# Patient Record
Sex: MALE | Race: ASIAN | NOT HISPANIC OR LATINO
[De-identification: names, ages, dates, MRNs, and addresses within clinical notes are randomized per-mention and may not be internally consistent; named-entity substitution may affect disease eponyms.]

---

## 2017-11-28 PROBLEM — Z00.00 ENCOUNTER FOR PREVENTIVE HEALTH EXAMINATION: Status: ACTIVE | Noted: 2017-11-28

## 2017-12-11 ENCOUNTER — APPOINTMENT (OUTPATIENT)
Dept: OPHTHALMOLOGY | Facility: CLINIC | Age: 52
End: 2017-12-11
Payer: MEDICAID

## 2017-12-11 PROCEDURE — 92225: CPT | Mod: RT

## 2017-12-11 PROCEDURE — 92004 COMPRE OPH EXAM NEW PT 1/>: CPT

## 2018-01-29 ENCOUNTER — APPOINTMENT (OUTPATIENT)
Dept: OPHTHALMOLOGY | Facility: CLINIC | Age: 53
End: 2018-01-29

## 2020-12-14 ENCOUNTER — APPOINTMENT (OUTPATIENT)
Dept: OPHTHALMOLOGY | Facility: CLINIC | Age: 55
End: 2020-12-14
Payer: MEDICAID

## 2020-12-14 ENCOUNTER — NON-APPOINTMENT (OUTPATIENT)
Age: 55
End: 2020-12-14

## 2020-12-14 PROCEDURE — 92250 FUNDUS PHOTOGRAPHY W/I&R: CPT

## 2020-12-14 PROCEDURE — 76514 ECHO EXAM OF EYE THICKNESS: CPT

## 2020-12-14 PROCEDURE — 99072 ADDL SUPL MATRL&STAF TM PHE: CPT

## 2020-12-14 PROCEDURE — 92015 DETERMINE REFRACTIVE STATE: CPT

## 2020-12-14 PROCEDURE — 92004 COMPRE OPH EXAM NEW PT 1/>: CPT

## 2021-01-11 ENCOUNTER — NON-APPOINTMENT (OUTPATIENT)
Age: 56
End: 2021-01-11

## 2021-01-11 ENCOUNTER — APPOINTMENT (OUTPATIENT)
Dept: OPHTHALMOLOGY | Facility: CLINIC | Age: 56
End: 2021-01-11
Payer: MEDICAID

## 2021-01-11 PROCEDURE — 92012 INTRM OPH EXAM EST PATIENT: CPT

## 2021-01-11 PROCEDURE — 92083 EXTENDED VISUAL FIELD XM: CPT

## 2021-01-11 PROCEDURE — 99072 ADDL SUPL MATRL&STAF TM PHE: CPT

## 2021-01-11 PROCEDURE — 92133 CPTRZD OPH DX IMG PST SGM ON: CPT

## 2021-11-12 ENCOUNTER — EMERGENCY (EMERGENCY)
Facility: HOSPITAL | Age: 56
LOS: 1 days | Discharge: HOME | End: 2021-11-12
Attending: STUDENT IN AN ORGANIZED HEALTH CARE EDUCATION/TRAINING PROGRAM | Admitting: EMERGENCY MEDICINE
Payer: MEDICAID

## 2021-11-12 VITALS
HEART RATE: 95 BPM | TEMPERATURE: 98 F | SYSTOLIC BLOOD PRESSURE: 183 MMHG | RESPIRATION RATE: 18 BRPM | DIASTOLIC BLOOD PRESSURE: 124 MMHG | OXYGEN SATURATION: 97 %

## 2021-11-12 PROCEDURE — 93010 ELECTROCARDIOGRAM REPORT: CPT

## 2021-11-12 PROCEDURE — 99285 EMERGENCY DEPT VISIT HI MDM: CPT | Mod: 25

## 2021-11-12 NOTE — ED ADULT TRIAGE NOTE - CHIEF COMPLAINT QUOTE
c/o dental pain x 2 days. states had root canal and cap placed a few weeks ago, thinks might be infected. hx htn, dm type 2

## 2021-11-13 LAB
ALBUMIN SERPL ELPH-MCNC: 4.4 G/DL — SIGNIFICANT CHANGE UP (ref 3.3–5)
ALP SERPL-CCNC: 59 U/L — SIGNIFICANT CHANGE UP (ref 40–120)
ALT FLD-CCNC: 28 U/L — SIGNIFICANT CHANGE UP (ref 4–41)
ANION GAP SERPL CALC-SCNC: 13 MMOL/L — SIGNIFICANT CHANGE UP (ref 7–14)
APTT BLD: 32.4 SEC — SIGNIFICANT CHANGE UP (ref 27–36.3)
AST SERPL-CCNC: 21 U/L — SIGNIFICANT CHANGE UP (ref 4–40)
B PERT DNA SPEC QL NAA+PROBE: SIGNIFICANT CHANGE UP
B PERT+PARAPERT DNA PNL SPEC NAA+PROBE: SIGNIFICANT CHANGE UP
BASOPHILS # BLD AUTO: 0.01 K/UL — SIGNIFICANT CHANGE UP (ref 0–0.2)
BASOPHILS # BLD AUTO: 0.02 K/UL — SIGNIFICANT CHANGE UP (ref 0–0.2)
BASOPHILS NFR BLD AUTO: 0.1 % — SIGNIFICANT CHANGE UP (ref 0–2)
BASOPHILS NFR BLD AUTO: 0.2 % — SIGNIFICANT CHANGE UP (ref 0–2)
BILIRUB SERPL-MCNC: 0.4 MG/DL — SIGNIFICANT CHANGE UP (ref 0.2–1.2)
BLD GP AB SCN SERPL QL: NEGATIVE — SIGNIFICANT CHANGE UP
BORDETELLA PARAPERTUSSIS (RAPRVP): SIGNIFICANT CHANGE UP
BUN SERPL-MCNC: 17 MG/DL — SIGNIFICANT CHANGE UP (ref 7–23)
C PNEUM DNA SPEC QL NAA+PROBE: SIGNIFICANT CHANGE UP
CALCIUM SERPL-MCNC: 9.3 MG/DL — SIGNIFICANT CHANGE UP (ref 8.4–10.5)
CHLORIDE SERPL-SCNC: 102 MMOL/L — SIGNIFICANT CHANGE UP (ref 98–107)
CK MB BLD-MCNC: 2.8 % — HIGH (ref 0–2.5)
CK MB CFR SERPL CALC: 5.2 NG/ML — SIGNIFICANT CHANGE UP
CK SERPL-CCNC: 189 U/L — SIGNIFICANT CHANGE UP (ref 30–200)
CO2 SERPL-SCNC: 22 MMOL/L — SIGNIFICANT CHANGE UP (ref 22–31)
CREAT SERPL-MCNC: 1.01 MG/DL — SIGNIFICANT CHANGE UP (ref 0.5–1.3)
EOSINOPHIL # BLD AUTO: 0.01 K/UL — SIGNIFICANT CHANGE UP (ref 0–0.5)
EOSINOPHIL # BLD AUTO: 0.11 K/UL — SIGNIFICANT CHANGE UP (ref 0–0.5)
EOSINOPHIL NFR BLD AUTO: 0.1 % — SIGNIFICANT CHANGE UP (ref 0–6)
EOSINOPHIL NFR BLD AUTO: 0.9 % — SIGNIFICANT CHANGE UP (ref 0–6)
FLUAV SUBTYP SPEC NAA+PROBE: SIGNIFICANT CHANGE UP
FLUBV RNA SPEC QL NAA+PROBE: SIGNIFICANT CHANGE UP
GLUCOSE SERPL-MCNC: 159 MG/DL — HIGH (ref 70–99)
HADV DNA SPEC QL NAA+PROBE: SIGNIFICANT CHANGE UP
HCOV 229E RNA SPEC QL NAA+PROBE: SIGNIFICANT CHANGE UP
HCOV HKU1 RNA SPEC QL NAA+PROBE: SIGNIFICANT CHANGE UP
HCOV NL63 RNA SPEC QL NAA+PROBE: SIGNIFICANT CHANGE UP
HCOV OC43 RNA SPEC QL NAA+PROBE: SIGNIFICANT CHANGE UP
HCT VFR BLD CALC: 41.8 % — SIGNIFICANT CHANGE UP (ref 39–50)
HCT VFR BLD CALC: 43.7 % — SIGNIFICANT CHANGE UP (ref 39–50)
HGB BLD-MCNC: 14.3 G/DL — SIGNIFICANT CHANGE UP (ref 13–17)
HGB BLD-MCNC: 14.8 G/DL — SIGNIFICANT CHANGE UP (ref 13–17)
HMPV RNA SPEC QL NAA+PROBE: SIGNIFICANT CHANGE UP
HPIV1 RNA SPEC QL NAA+PROBE: SIGNIFICANT CHANGE UP
HPIV2 RNA SPEC QL NAA+PROBE: SIGNIFICANT CHANGE UP
HPIV3 RNA SPEC QL NAA+PROBE: SIGNIFICANT CHANGE UP
HPIV4 RNA SPEC QL NAA+PROBE: SIGNIFICANT CHANGE UP
IANC: 10.3 K/UL — HIGH (ref 1.5–8.5)
IANC: 8.64 K/UL — HIGH (ref 1.5–8.5)
IMM GRANULOCYTES NFR BLD AUTO: 0.2 % — SIGNIFICANT CHANGE UP (ref 0–1.5)
IMM GRANULOCYTES NFR BLD AUTO: 0.3 % — SIGNIFICANT CHANGE UP (ref 0–1.5)
INR BLD: 1.04 RATIO — SIGNIFICANT CHANGE UP (ref 0.88–1.16)
LYMPHOCYTES # BLD AUTO: 1.35 K/UL — SIGNIFICANT CHANGE UP (ref 1–3.3)
LYMPHOCYTES # BLD AUTO: 11.4 % — LOW (ref 13–44)
LYMPHOCYTES # BLD AUTO: 24.9 % — SIGNIFICANT CHANGE UP (ref 13–44)
LYMPHOCYTES # BLD AUTO: 3.09 K/UL — SIGNIFICANT CHANGE UP (ref 1–3.3)
M PNEUMO DNA SPEC QL NAA+PROBE: SIGNIFICANT CHANGE UP
MCHC RBC-ENTMCNC: 28.4 PG — SIGNIFICANT CHANGE UP (ref 27–34)
MCHC RBC-ENTMCNC: 28.6 PG — SIGNIFICANT CHANGE UP (ref 27–34)
MCHC RBC-ENTMCNC: 33.9 GM/DL — SIGNIFICANT CHANGE UP (ref 32–36)
MCHC RBC-ENTMCNC: 34.2 GM/DL — SIGNIFICANT CHANGE UP (ref 32–36)
MCV RBC AUTO: 83.6 FL — SIGNIFICANT CHANGE UP (ref 80–100)
MCV RBC AUTO: 83.7 FL — SIGNIFICANT CHANGE UP (ref 80–100)
MONOCYTES # BLD AUTO: 0.09 K/UL — SIGNIFICANT CHANGE UP (ref 0–0.9)
MONOCYTES # BLD AUTO: 0.53 K/UL — SIGNIFICANT CHANGE UP (ref 0–0.9)
MONOCYTES NFR BLD AUTO: 0.8 % — LOW (ref 2–14)
MONOCYTES NFR BLD AUTO: 4.3 % — SIGNIFICANT CHANGE UP (ref 2–14)
NEUTROPHILS # BLD AUTO: 10.3 K/UL — HIGH (ref 1.8–7.4)
NEUTROPHILS # BLD AUTO: 8.64 K/UL — HIGH (ref 1.8–7.4)
NEUTROPHILS NFR BLD AUTO: 69.5 % — SIGNIFICANT CHANGE UP (ref 43–77)
NEUTROPHILS NFR BLD AUTO: 87.3 % — HIGH (ref 43–77)
NRBC # BLD: 0 /100 WBCS — SIGNIFICANT CHANGE UP
NRBC # BLD: 0 /100 WBCS — SIGNIFICANT CHANGE UP
NRBC # FLD: 0 K/UL — SIGNIFICANT CHANGE UP
NRBC # FLD: 0 K/UL — SIGNIFICANT CHANGE UP
PLATELET # BLD AUTO: 232 K/UL — SIGNIFICANT CHANGE UP (ref 150–400)
PLATELET # BLD AUTO: 234 K/UL — SIGNIFICANT CHANGE UP (ref 150–400)
POTASSIUM SERPL-MCNC: 3.8 MMOL/L — SIGNIFICANT CHANGE UP (ref 3.5–5.3)
POTASSIUM SERPL-SCNC: 3.8 MMOL/L — SIGNIFICANT CHANGE UP (ref 3.5–5.3)
PROT SERPL-MCNC: 8.4 G/DL — HIGH (ref 6–8.3)
PROTHROM AB SERPL-ACNC: 11.9 SEC — SIGNIFICANT CHANGE UP (ref 10.6–13.6)
RAPID RVP RESULT: SIGNIFICANT CHANGE UP
RBC # BLD: 5 M/UL — SIGNIFICANT CHANGE UP (ref 4.2–5.8)
RBC # BLD: 5.22 M/UL — SIGNIFICANT CHANGE UP (ref 4.2–5.8)
RBC # FLD: 12.6 % — SIGNIFICANT CHANGE UP (ref 10.3–14.5)
RBC # FLD: 12.7 % — SIGNIFICANT CHANGE UP (ref 10.3–14.5)
RH IG SCN BLD-IMP: POSITIVE — SIGNIFICANT CHANGE UP
RSV RNA SPEC QL NAA+PROBE: SIGNIFICANT CHANGE UP
RV+EV RNA SPEC QL NAA+PROBE: SIGNIFICANT CHANGE UP
SARS-COV-2 RNA SPEC QL NAA+PROBE: SIGNIFICANT CHANGE UP
SODIUM SERPL-SCNC: 137 MMOL/L — SIGNIFICANT CHANGE UP (ref 135–145)
TROPONIN T, HIGH SENSITIVITY RESULT: 21 NG/L — SIGNIFICANT CHANGE UP
TROPONIN T, HIGH SENSITIVITY RESULT: 24 NG/L — SIGNIFICANT CHANGE UP
WBC # BLD: 11.8 K/UL — HIGH (ref 3.8–10.5)
WBC # BLD: 12.42 K/UL — HIGH (ref 3.8–10.5)
WBC # FLD AUTO: 11.8 K/UL — HIGH (ref 3.8–10.5)
WBC # FLD AUTO: 12.42 K/UL — HIGH (ref 3.8–10.5)

## 2021-11-13 PROCEDURE — G1004: CPT

## 2021-11-13 PROCEDURE — 70487 CT MAXILLOFACIAL W/DYE: CPT | Mod: 26,MG

## 2021-11-13 PROCEDURE — 71045 X-RAY EXAM CHEST 1 VIEW: CPT | Mod: 26

## 2021-11-13 PROCEDURE — 70450 CT HEAD/BRAIN W/O DYE: CPT | Mod: 26,MA

## 2021-11-13 PROCEDURE — 99218: CPT | Mod: 25

## 2021-11-13 RX ORDER — INSULIN LISPRO 100/ML
VIAL (ML) SUBCUTANEOUS
Refills: 0 | Status: DISCONTINUED | OUTPATIENT
Start: 2021-11-13 | End: 2021-11-16

## 2021-11-13 RX ORDER — GLUCAGON INJECTION, SOLUTION 0.5 MG/.1ML
1 INJECTION, SOLUTION SUBCUTANEOUS ONCE
Refills: 0 | Status: DISCONTINUED | OUTPATIENT
Start: 2021-11-13 | End: 2021-11-16

## 2021-11-13 RX ORDER — DEXTROSE 50 % IN WATER 50 %
12.5 SYRINGE (ML) INTRAVENOUS ONCE
Refills: 0 | Status: DISCONTINUED | OUTPATIENT
Start: 2021-11-13 | End: 2021-11-16

## 2021-11-13 RX ORDER — ACETAMINOPHEN 500 MG
650 TABLET ORAL ONCE
Refills: 0 | Status: COMPLETED | OUTPATIENT
Start: 2021-11-13 | End: 2021-11-13

## 2021-11-13 RX ORDER — DEXTROSE 50 % IN WATER 50 %
25 SYRINGE (ML) INTRAVENOUS ONCE
Refills: 0 | Status: DISCONTINUED | OUTPATIENT
Start: 2021-11-13 | End: 2021-11-16

## 2021-11-13 RX ORDER — SODIUM CHLORIDE 9 MG/ML
1000 INJECTION, SOLUTION INTRAVENOUS
Refills: 0 | Status: DISCONTINUED | OUTPATIENT
Start: 2021-11-13 | End: 2021-11-16

## 2021-11-13 RX ORDER — AMPICILLIN SODIUM AND SULBACTAM SODIUM 250; 125 MG/ML; MG/ML
3 INJECTION, POWDER, FOR SUSPENSION INTRAMUSCULAR; INTRAVENOUS EVERY 6 HOURS
Refills: 0 | Status: DISCONTINUED | OUTPATIENT
Start: 2021-11-13 | End: 2021-11-16

## 2021-11-13 RX ORDER — DEXTROSE 50 % IN WATER 50 %
15 SYRINGE (ML) INTRAVENOUS ONCE
Refills: 0 | Status: DISCONTINUED | OUTPATIENT
Start: 2021-11-13 | End: 2021-11-16

## 2021-11-13 RX ORDER — ACETAMINOPHEN 500 MG
650 TABLET ORAL ONCE
Refills: 0 | Status: DISCONTINUED | OUTPATIENT
Start: 2021-11-13 | End: 2021-11-13

## 2021-11-13 RX ADMIN — Medication 1: at 12:00

## 2021-11-13 RX ADMIN — Medication 650 MILLIGRAM(S): at 18:13

## 2021-11-13 RX ADMIN — AMPICILLIN SODIUM AND SULBACTAM SODIUM 200 GRAM(S): 250; 125 INJECTION, POWDER, FOR SUSPENSION INTRAMUSCULAR; INTRAVENOUS at 18:20

## 2021-11-13 RX ADMIN — Medication 1 TABLET(S): at 04:15

## 2021-11-13 RX ADMIN — Medication 650 MILLIGRAM(S): at 18:41

## 2021-11-13 RX ADMIN — Medication 650 MILLIGRAM(S): at 01:06

## 2021-11-13 RX ADMIN — AMPICILLIN SODIUM AND SULBACTAM SODIUM 200 GRAM(S): 250; 125 INJECTION, POWDER, FOR SUSPENSION INTRAMUSCULAR; INTRAVENOUS at 06:46

## 2021-11-13 RX ADMIN — AMPICILLIN SODIUM AND SULBACTAM SODIUM 200 GRAM(S): 250; 125 INJECTION, POWDER, FOR SUSPENSION INTRAMUSCULAR; INTRAVENOUS at 13:06

## 2021-11-13 RX ADMIN — Medication 50 MILLIGRAM(S): at 06:07

## 2021-11-13 NOTE — ED ADULT NURSE REASSESSMENT NOTE - NS ED NURSE REASSESS COMMENT FT1
Patient arrives to room 17, as a code stroke due to left sided numbness. Patient was originally evaluated in intake for dental pain. During a dental consult patient began experiencing numbness to the left side of his face, which prompted the code stroke. Patient is alert oriented, speaking in full sentences. Left facial swelling noted at this time with no active bleeding. No facial droop or slurred speech noted. Pt has equal strength, motor, and sensation to bilateral upper and lower extremities. No drifts noted to bilateral upper and lower extremities. Respirations are equal and unlabored. Placed on a cardiac monitor, and will continue to monitor. Patient arrives to room 17, as a code stroke due to left sided numbness. Patient was originally evaluated in intake for dental pain. During a dental consult patient began experiencing numbness to the left side of his face, which prompted the code stroke. Patient is alert oriented, speaking in full sentences. Left facial, lower lip swelling noted at this time with no active bleeding. No facial droop or slurred speech noted. Pt has equal strength, motor, and sensation to bilateral upper and lower extremities. No drifts noted to bilateral upper and lower extremities. Respirations are equal and unlabored. Placed on a cardiac monitor, and will continue to monitor.

## 2021-11-13 NOTE — ED PROVIDER NOTE - PHYSICAL EXAMINATION
Vital signs reviewed.   CONSTITUTIONAL: Well-appearing; well-nourished; in no apparent distress. Non-toxic appearing.   HEAD: Normocephalic, atraumatic.  EYES: PERRL, EOM intact, conjunctiva and sclera WNL.  ENT: normal nose; no rhinorrhea; normal pharynx, +lip swelling, no drooling, no sublingual edema, +TTP noted Lt tooth # 14. No gingival abscess noted. +intraoral mucosa irritation noted. Airway patent.   CARD: Normal S1, S2; no murmurs, rubs, or gallops noted.  RESP: Normal chest excursion with respiration; breath sounds clear and equal bilaterally; no wheezes, rhonchi, or rales.  EXT/MS: moves all extremities;   SKIN: Normal for age and race; Mild Left facial swelling noted. No crepitus. No erythema.   NEURO: Awake, alert, oriented x 3, no gross deficits, no motor or sensory deficit noted.  PSYCH: Normal mood; appropriate affect.

## 2021-11-13 NOTE — ED ADULT NURSE REASSESSMENT NOTE - NS ED NURSE REASSESS COMMENT FT1
report givne to cdu/  pt a&4, lower lip swollen and upper slightly swollen/ offers no complaints of pain/  iv patent in rt ac 18 g/ med infusing by nite shift

## 2021-11-13 NOTE — ED ADULT NURSE NOTE - OBJECTIVE STATEMENT
pt received in intake. A&OX3 ambulatory. c/o dental pain x 2 days. states had root canal and cap placed a few weeks ago. denies fevers or SOB/trouble breathing. resp even and unlabored. Pt R side of face appears swollen. denies bleeding or discharge from area. will continue to monitor.

## 2021-11-13 NOTE — ED ADULT NURSE REASSESSMENT NOTE - NS ED NURSE REASSESS COMMENT FT1
BREAK RN: Pt A&O x 4. In NAD at this time. Needs anticipated and met in a timely manner. Stretcher in lowest position, wheels locked, appropriate side rails in place, call bell in reach.

## 2021-11-13 NOTE — ED PROVIDER NOTE - PROGRESS NOTE DETAILS
Ilia NAVARRO: Pt signed out to me.  He has been evaluated by dental, rec'd local anesthesia, I&D.  Pt with worsening of facial swelling since ED arrival and code stroke called by previous provider.  Pt with L sided facial droop involving upper and lower face.  No other noted deficits on exam.  Pt with significant swelling of face and lips, but airway is maintained.  Pending CT maxiface and head, dental aware and re-evaluating.  Peripheral facial nerve palsy ?mass vs abscess vs inflammation/swelling pending CT. PA Rodrigez- Stroke work up negative. Facial droop/palsy likely 2/2 from nerve/dental block.  Pt CT maxillofacial showing +dental abscess, drain in place. And ?sinusitis. Results reviewed with dental resident and they agree with further dose of abx and monitoring of facial swelling, no indication for further imaging or intervention. Pt agreeable with stay. Facial asymmetry improving. CDU PA accepted.

## 2021-11-13 NOTE — ED CDU PROVIDER INITIAL DAY NOTE - ATTENDING CONTRIBUTION TO CARE
ATTENDING, MD Julee: I have personally performed a face to face diagnostic evaluation on this patient.  I have reviewed the ACP note and agree with the history, exam, and plan of care, except as noted here. Progress notes and further evaluation to be reviewed by observation and discharging attending.    55 yo M with HTN, HLD, DM, s/p dental procedure about 1mo ago, now having swollen face, neurological deficits on the face, initiated code stroke yesterday, but now no facial or ext neurological dysfunction, likely facial nv palsy 2/2 inflammation, angio edematic swelling, a part of the soft palate is visible, and airway is patent, but given his acute course of the swelling needs airway evaluation by ENT. dispo pending but if airway patent by ENT scope, pt is safe for discharge to the original dental surgeon. Pt shows understanding.

## 2021-11-13 NOTE — ED CDU PROVIDER INITIAL DAY NOTE - PROGRESS NOTE DETAILS
JENAE Jorge: pt resting comfortably NAD, pt seen and evaluated by ENT to assess airway given lip swelling; per ENT airway patent no indication for scope at this time.  Pt denies sob, chest pain, throat tightness.  Pt speaking in full sentences, tolerating secretions.  Will continue to monitor. JENAE Jorge: pt resting comfortably NAD, swelling to lips/face improving, pt denies chest pain, sob, throat discomfort.  Will continue to monitor.

## 2021-11-13 NOTE — CONSULT NOTE ADULT - SUBJECTIVE AND OBJECTIVE BOX
HPI:  Patient is a 56 y.o male with PMHx of HTN, HLD, DM who presents to ED c/o Lt upper dental pain intermittently x 3 weeks with associated facial swelling. Pt states that approx 1 month ago he had root canal with cap placed to Lt upper tooth # 14. Pt noting recent Lt side facial swelling and now lip swelling. Pt denies fevers, chills, discharge, slurred speech, n/v/d, trauma, rashes, throat itching/swelling, sob, dysphagia or any other complaints.  Not on ace inhibitor, NKDA.       Physical Exam  T(C): 36.8 (11-13-21 @ 14:21), Max: 36.8 (11-12-21 @ 22:44)  HR: 98 (11-13-21 @ 14:21) (78 - 98)  BP: 147/74 (11-13-21 @ 14:21) (147/74 - 183/124)  RR: 15 (11-13-21 @ 14:21) (14 - 18)  SpO2: 97% (11-13-21 @ 14:21) (96% - 100%)  General: NAD, A+Ox3  Resp: No respiratory distress, stridor, or stertor  Voice quality: normal  Face:  Symmetric without masses or lesions  OU: EOMI  OC/OP: tongue normal, floor of mouth soft, no masses or lesions, OP clear with no intraoral edema  Neck: soft/flat    A/P: 56y Male p/w facial swelling in setting of dental infection s/p root canal of L upper tooth. Patient has no throat discomfort or difficulty breathing. No intraoral edema noted, FOM soft. Swelling likely 2/2 active tooth infection and unlikely angioedema.   -will defer FOE for now as low suspicion for angioedema based off of hx and PE  -recommend continued abx   -please page if pt develops SOB, stridor    --------------------------------------------------------------  Thank you for the consult,    Rocío Mccracken MD  Resident  Department of Otolaryngology - Head and Neck Surgery  Peds Page #[phone]  Adult Page #[phone]  ---------------------------------------------------------------

## 2021-11-13 NOTE — ED PROVIDER NOTE - ATTENDING CONTRIBUTION TO CARE
I have personally performed a face to face medical and diagnostic evaluation of the patient. I have discussed with and reviewed the ACP's note and agree with the History, ROS, Physical Exam and MDM unless otherwise indicated. A brief summary of my personal evaluation and impression can be found below.     55yo M hx htn hld dm p.w tooth #14 pain where had had a cap replaced 1 month ago from old root canal now w/ intermittent pain x several weeks now worsening today. Been using peroxide and orajel to relief. Notes since arriving in ED has L sided facial swelling and lip swelling since arriving to ED. No slurred speech focal weakness/numbness  VITALS: Initial triage and subsequent vitals have been reviewed by me.  Gen: Well appearing, NAD, alert, non-toxic  Head: NCAT  HEENT: PERRL, MMM, normal conjunctiva, anicteric, neck supple, facial swelling to lips and mildly to L face, inflammation near tooth #14 no obvious fluctuance  Lung: CTAB, no adventitious sounds  CV: RRR, no murmurs, 2+symmetric peripheral pulses  Abd: soft, NTND, no rebound or guarding, no palpable masses  MSK: No edema, no visible deformities  Neuro: Following commands appropriately, fluid speech, L facial droop nasolabial fold flattening, forehead spared, 5/5 strength and normal sensation in all extremities. Ambulatory with stable gait.  Skin: Warm and dry, no evidence of rash  Psych: normal mood and affect  Lip swelling concern of dental abscess w/ dental for eval. Upon my evaluation w/ apparent facial palsy concern for invasive infx vs palsy from local anesthesia vs lower suspicion CVA. Will get neuro eval and CTH/max face and reassess I have personally performed a face to face medical and diagnostic evaluation of the patient. I have discussed with and reviewed the ACP's note and agree with the History, ROS, Physical Exam and MDM unless otherwise indicated. A brief summary of my personal evaluation and impression can be found below.     55yo M hx htn hld dm p.w tooth #14 pain where had had a cap replaced 1 month ago from old root canal now w/ intermittent pain x several weeks now worsening today. Been using peroxide and orajel to relief. Notes since arriving in ED has L sided facial swelling and lip swelling since arriving to ED. No slurred speech focal weakness/numbness  VITALS: Initial triage and subsequent vitals have been reviewed by me.  Gen: Well appearing, NAD, alert, non-toxic  Head: NCAT  HEENT: PERRL, MMM, normal conjunctiva, anicteric, neck supple, facial swelling to lips and mildly to L face, inflammation near tooth #14 no obvious fluctuance  Lung: CTAB, no adventitious sounds  CV: RRR, no murmurs, 2+symmetric peripheral pulses  Abd: soft, NTND, no rebound or guarding, no palpable masses  MSK: No edema, no visible deformities  Neuro: Following commands appropriately, fluid speech, L facial droop nasolabial fold flattening, forehead spared, 5/5 strength and normal sensation in all extremities. Ambulatory with stable gait.  Skin: Warm and dry, no evidence of rash  Psych: normal mood and affect  Lip swelling concern of dental abscess w/ dental for eval. Upon my evaluation w/ apparent facial palsy concern for invasive infx vs palsy from local anesthesia vs lower suspicion CVA. Per accompanying ACP, facial assymetry/nasolabial fold was poss slightly present but is now sig worse. Will call code stroke, get neuro eval and CT/max face and reassess

## 2021-11-13 NOTE — ED PROVIDER NOTE - CLINICAL SUMMARY MEDICAL DECISION MAKING FREE TEXT BOX
HPI- Patient is a 56 y.o male with PMHx of HTN, HLD, DM who presents to ED c/o Lt upper dental pain intermittently x 3 weeks. Pt states that approx 1 month ago he had root canal with cap placed to Lt upper tooth # 14. Pt states he has been having intermittent Lt tooth sensitivities for past few weeks, today pain was more severe. Pt states he has been using orajel and used almost full bottle of peroxide doing mouth washes. Pt noting Lt side facial swelling and now lip swelling. Pt denies fevers, chills, discharge, slurred speech, n/v/d, trauma, rashes, throat itching/swelling, sob, dysphagia or any other complaints.       Ddx- dental pain, no gross signs of infection, lip swelling and intraoral mucosal irritation appears likely 2/2 overuse of peroxide. Dental called for consult. Will give pain meds.

## 2021-11-13 NOTE — ED CDU PROVIDER INITIAL DAY NOTE - MEDICAL DECISION MAKING DETAILS
Pt evaluated in ED, dental consulted, small abscess noted, drain placed, recommended iv abx and dc on augmentin

## 2021-11-13 NOTE — ED CDU PROVIDER INITIAL DAY NOTE - OBJECTIVE STATEMENT
HPI- Patient is a 56 y.o male with PMHx of HTN, HLD, DM who presents to ED c/o Lt upper dental pain intermittently x 3 weeks. Pt states that approx 1 month ago he had root canal with cap placed to Lt upper tooth # 14. Pt states he has been having intermittent Lt tooth sensitivities for past few weeks, today pain was more severe. Pt states he has been using orajel and used almost full bottle of peroxide doing mouth washes. Pt noting Lt side facial swelling and now lip swelling. Pt denies fevers, chills, discharge, slurred speech, n/v/d, trauma, rashes, throat itching/swelling, sob, dysphagia or any other complaints.  Not on ace inhibitor    CDU JENAE PINEDA - Agree with above. Pt evaluated in ED, dental consulted, small abscess noted, drain placed, recommended iv abx and dc on augmentin.  Pt to f/u for drain removal in 2-3 days.

## 2021-11-13 NOTE — PROGRESS NOTE ADULT - SUBJECTIVE AND OBJECTIVE BOX
CC: 57 Yo male Patient presents with tooth pain in UL quad with associated facial and gingival swelling.    HPI: Patient reports he had a root canal done on the tooth 20 years ago but approximately one month ago the crown on top was replaced. Over the last two days he has been having pain and starting earlier today he has been having worsening pain and swelling. Pt has been rinsing with straight hydrogen peroxide all day and reports it made him feel better initially but now nothing is helping. Pt states he has an appointment with his outside dentist on tuesday but could not wait with all of the pain he is in.    Med HX: No significant family history    HTN (hypertension)    Diabetes mellitus type 2, uncontrolled    DENTAL PAIN    [email or web address]        Allergies: No Known Allergies      RX:amoxicillin  875 milliGRAM(s)/clavulanate 1 Tablet(s) Oral Once      EOE: (+) swelling to the left face and upper lip with asymmetry   TMJ (WNL)  Trismus (-)  LAD (-)  Dysphagia (-)    IOE: Permanent dentition. (+) swelling. (+) palpation tender to the UL with UL buccal vestibular swelling  (+) Minor percussion tenderness to tooth #13  Hard/Soft palate (WNL)  Tongue/Floor of Mouth (WNL)  Buccal Mucosa (WNL)  Mobility (-)     Radiographs: PA taken and interpreted. Widened PDL associated with tooth #13    Additional Radiograph: CT ordered by ED prior to dental on call arrival.    Assessment: Likely infection, possible signs of tooth fracture associated with previously RCT treated tooth #13 with associated swelling    Treatment: Discussed radiographic and clinical findings with patient. Disccused RBA of recommended treatment. Obtained verbal consent. Applied 20% benzocaine. Administered 2 carpules 4% septocaine 1:100k epi via local infiltration with changing of needles after each administration. Incised to bone, dissected fascial planes, and massaged the area. Irrigated copiously with sterline saline. Penrose drain placed with 1 4-0 silk suture. Hemostasis achieved. POIG. All questions answered.    Recommendations:   1. OTC pain medication as needed.  2. Per ED, complete course of antibiotics.  3. F/U in 2-3 business days for drain removal with either outpatient dentist or [facility] dental clinic [phone].  4. If any difficulty breathing/swallowing or fever and swelling occur, return to ED.      Kale Griffith DDS#[id number] CC: 57 Yo male Patient presents with tooth pain in UL quad with associated facial and gingival swelling.    HPI: Patient reports he had a root canal done on the tooth 20 years ago but approximately one month ago the crown on top was replaced. Over the last two days he has been having pain and starting earlier today he has been having worsening pain and swelling. Pt has been rinsing with straight hydrogen peroxide all day and reports it made him feel better initially but now nothing is helping. Pt states he has an appointment with his outside dentist on tuesday but could not wait with all of the pain he is in.    Med HX: No significant family history    HTN (hypertension)    Diabetes mellitus type 2, uncontrolled    DENTAL PAIN    [email or web address]        Allergies: No Known Allergies      RX:amoxicillin  875 milliGRAM(s)/clavulanate 1 Tablet(s) Oral Once      EOE: (+) swelling to the left face and upper lip with asymmetry   TMJ (WNL)  Trismus (-)  LAD (-)  Dysphagia (-)    IOE: Permanent dentition. (+) swelling. (+) palpation tender to the UL with UL buccal vestibular swelling  (+) Minor percussion tenderness to tooth #13  Hard/Soft palate (WNL)  Tongue/Floor of Mouth (WNL)  Buccal Mucosa (WNL)  Mobility (-)     Radiographs: PA taken and interpreted. Widened PDL associated with tooth #13    Additional Radiograph: CT ordered by ED prior to dental on call arrival.    Assessment: Likely infection, possible signs of tooth fracture associated with previously RCT treated tooth #13 with associated swelling    Treatment: Discussed radiographic and clinical findings with patient. Disccused RBA of recommended treatment. Obtained verbal consent. Applied 20% benzocaine. Administered 2 carpules 4% septocaine 1:100k epi via local infiltration with changing of needles after each administration. Incised to bone, dissected fascial planes, and massaged the area. Irrigated copiously with sterline saline. Penrose drain placed with 1 4-0 silk suture. Hemostasis achieved. POIG. All questions answered. Paged second time regarding patient with left sided lip drooping. Upon second exam, patient had no loss of ability to open or close eye or look side to side. Pt sent for CT scan and awaiting results. Pt states he does not feel any different from when he came in, no loss of feeling in periphery, or difficulty swallowing or speaking.    Recommendations:   1. OTC pain medication as needed.  2. Per ED, complete course of antibiotics.  3. F/U in 2-3 business days for drain removal with either outpatient dentist or [facility] dental clinic [phone].  4. If any difficulty breathing/swallowing or fever and swelling occur, return to ED.      Kale Griffith DDS#[id number]

## 2021-11-14 VITALS
HEART RATE: 84 BPM | TEMPERATURE: 99 F | OXYGEN SATURATION: 99 % | SYSTOLIC BLOOD PRESSURE: 144 MMHG | DIASTOLIC BLOOD PRESSURE: 86 MMHG | RESPIRATION RATE: 14 BRPM

## 2021-11-14 PROCEDURE — 99217: CPT

## 2021-11-14 RX ORDER — IBUPROFEN 200 MG
1 TABLET ORAL
Qty: 20 | Refills: 0
Start: 2021-11-14 | End: 2021-11-18

## 2021-11-14 RX ORDER — ACETAMINOPHEN 500 MG
650 TABLET ORAL EVERY 6 HOURS
Refills: 0 | Status: DISCONTINUED | OUTPATIENT
Start: 2021-11-14 | End: 2021-11-16

## 2021-11-14 RX ADMIN — AMPICILLIN SODIUM AND SULBACTAM SODIUM 200 GRAM(S): 250; 125 INJECTION, POWDER, FOR SUSPENSION INTRAMUSCULAR; INTRAVENOUS at 06:26

## 2021-11-14 RX ADMIN — AMPICILLIN SODIUM AND SULBACTAM SODIUM 200 GRAM(S): 250; 125 INJECTION, POWDER, FOR SUSPENSION INTRAMUSCULAR; INTRAVENOUS at 11:47

## 2021-11-14 RX ADMIN — Medication 650 MILLIGRAM(S): at 06:38

## 2021-11-14 RX ADMIN — AMPICILLIN SODIUM AND SULBACTAM SODIUM 200 GRAM(S): 250; 125 INJECTION, POWDER, FOR SUSPENSION INTRAMUSCULAR; INTRAVENOUS at 00:57

## 2021-11-14 RX ADMIN — Medication 1: at 11:42

## 2021-11-14 RX ADMIN — Medication 650 MILLIGRAM(S): at 07:38

## 2021-11-14 NOTE — ED CDU PROVIDER SUBSEQUENT DAY NOTE - HISTORY
Patient is a 56 y.o male with PMHx of HTN, HLD, DM who presents to ED c/o Lt upper dental pain intermittently x 3 weeks. Pt states that approx 1 month ago he had root canal with cap placed to Lt upper tooth # 14. Pt states he has been having intermittent Lt tooth sensitivities for past few weeks, today pain was more severe. Pt states he has been using orajel and used almost full bottle of peroxide doing mouth washes. Pt noting Lt side facial swelling and now lip swelling. Pt denies fevers, chills, discharge, slurred speech, n/v/d, trauma, rashes, throat itching/swelling, sob, dysphagia or any other complaints.  Not on ace inhibitor. Pt evaluated in ED, dental consulted, small abscess noted, drain placed, recommended iv abx and dc on Augmentin.    CDU JENAE Avila: Agree with above. Patient receiving IV antibiotics, facial swelling improving. Denies throat closing, shortness of breath or difficulty breathing. Pain well controlled. Plan for IV abx and dental follow up in the morning.

## 2021-11-14 NOTE — ED CDU PROVIDER SUBSEQUENT DAY NOTE - NS ED ROS FT
Constitutional: (-) Fever, (-) Chills  Skin: (-) Rashes  Eyes: (-) Visual changes, (-) Discharge, (-) Redness  Ears: (-) Hearing loss, (-)Tinnitus, (-) Ear pain  Nose: (-) Nasal congestion, (-) Runny nose  Mouth/Throat: (+)Facial swelling, (+) Dental pain, (-) Sore throat, (-) Vocal changes  CV: (-) Chest pain, (-) Palpitations  Resp: (-) Cough, (-) Shortness of breath, (-) Dyspnea on Exertion, (-) Wheezing  GI: (-) Abdominal pain, (-) Nausea, (-) Vomiting, (-) Diarrhea  : (-) Dysuria  MSK: (-) Myalgias  Neuro:(-) Headache

## 2021-11-14 NOTE — ED CDU PROVIDER DISPOSITION NOTE - PATIENT PORTAL LINK FT
You can access the FollowMyHealth Patient Portal offered by [facility] by registering at the following website: [email or web address]. By joining [employer]’s FollowMyHealth portal, you will also be able to view your health information using other applications (apps) compatible with our system.

## 2021-11-14 NOTE — ED CDU PROVIDER SUBSEQUENT DAY NOTE - MEDICAL DECISION MAKING DETAILS
56y Male with PMHx of HTN, HLD, DM presents to the ER for dental pain x 3 weeks. Seen by dental, I&D performed, drain placed. Vital signs stable. Patient receiving IV antibiotics, facial swelling improving. Denies throat closing, shortness of breath or difficulty breathing. Pain well controlled. Plan for IV abx and dental follow up in the morning.

## 2021-11-14 NOTE — ED CDU PROVIDER DISPOSITION NOTE - NSFOLLOWUPINSTRUCTIONS_ED_ALL_ED_FT
Please take the following medications as followed:  - Ibuprofen 600mg every 6 hours for pain control.  - Augmentin 1 tablet twice a day for a week.    Follow up with your PMD within 48-72 hrs. Show copies of your reports given to you. Take all of your medications as previously prescribed.    Follow up in 2-3 business days for drain removal with either outpatient dentist or [facility] dental clinic [phone].    If you have any new, worsened or concerning symptoms, please return to the emergency department immediately.

## 2021-11-14 NOTE — ED CDU PROVIDER SUBSEQUENT DAY NOTE - ATTENDING CONTRIBUTION TO CARE
Attending Statement: I have reviewed and agree with all pertinent clinical information, including history and physical exam and agree with treatment plan of the PA, except as noted.  56 y.o male with PMHx of HTN, HLD, DM who presents to ED c/o Lt upper dental pain intermittently x 3 weeks. Pt states that approx 1 month ago he had root canal with cap placed to Lt upper tooth # 14. Pt states he has been having intermittent Lt tooth sensitivities for past few weeks, today pain was more severe. Pt states he has been using orajel and used almost full bottle of peroxide doing mouth washes. Pt noting Lt side facial swelling and now lip swelling. Pt denies fevers, chills, discharge, slurred speech, n/v/d, trauma, rashes, throat itching/swelling, sob, dysphagia or any other complaints.  Not on ace inhibitor. Pt evaluated in ED, dental consulted, small abscess noted, drain placed, recommended iv abx and dc on Augmentin.  Agree with above Dr Brown. pt feeling better this morning. Denies any difficulty speaking or swallowing. Tolerating a soft liq diet. no fever States swelling is less  Vital signs noted. ambulatory no drooling no stridor. well appearing. mild swelling of both lips. no tongue swelling. no blisters noted. no dc. no swelling of submandibular area. No resp distress. able to speak in full and clear sentences. no wheeze, rales or stridor.  plan dental recommendation, anna lindsey home.

## 2021-11-14 NOTE — ED CDU PROVIDER SUBSEQUENT DAY NOTE - PROGRESS NOTE DETAILS
Patient seen resting comfortably, in no acute distress. Facial swelling stable/improving. Vital signs stable. Will continue to monitor and reassess. Pt reassessed, states he feels much better. lip swelling much improved. Denies any stridor, drooling, neck pain, difficulties swallowing, chest pain or dyspnea. HD stable, afebrile. Able to swallow. Dental recs appreciated. dc home with abx and outpatient f/u for drain removal.

## 2021-11-14 NOTE — ED CDU PROVIDER DISPOSITION NOTE - CLINICAL COURSE
Patient is a 56 y.o male with PMHx of HTN, HLD, DM who presents to ED c/o Lt upper dental pain intermittently x 3 weeks. Pt states that approx 1 month ago he had root canal with cap placed to Lt upper tooth # 14. Pt states he has been having intermittent Lt tooth sensitivities for past few weeks, today pain was more severe. Pt states he has been using orajel and used almost full bottle of peroxide doing mouth washes. Pt noting Lt side facial swelling and now lip swelling. Pt denies fevers, chills, discharge, slurred speech, n/v/d, trauma, rashes, throat itching/swelling, sob, dysphagia or any other complaints.  Not on ace inhibitor. Pt evaluated in ED, dental consulted, small abscess noted, drain placed, recommended iv abx and dc on Augmentin.  Pt reassessed, states he feels much better. lip swelling much improved. Denies any stridor, drooling, neck pain, difficulties swallowing, chest pain or dyspnea. HD stable, afebrile. Able to swallow. Dental recs appreciated. dc home with abx and outpatient f/u for drain removal.

## 2021-12-10 NOTE — ED CDU PROVIDER SUBSEQUENT DAY NOTE - CPE EDP RESP NORM
In responding to this request, please exercise your independent professional judgment.  The fact that a question is asked does not imply that any particular answer is desired or expected.
normal...

## 2022-10-13 ENCOUNTER — APPOINTMENT (OUTPATIENT)
Dept: OPHTHALMOLOGY | Facility: CLINIC | Age: 57
End: 2022-10-13

## 2022-10-13 ENCOUNTER — NON-APPOINTMENT (OUTPATIENT)
Age: 57
End: 2022-10-13

## 2022-10-13 PROCEDURE — 92014 COMPRE OPH EXAM EST PT 1/>: CPT

## 2022-10-13 PROCEDURE — 92083 EXTENDED VISUAL FIELD XM: CPT

## 2022-10-13 PROCEDURE — 92015 DETERMINE REFRACTIVE STATE: CPT | Mod: NC

## 2022-10-13 PROCEDURE — 92133 CPTRZD OPH DX IMG PST SGM ON: CPT

## 2024-08-20 ENCOUNTER — APPOINTMENT (OUTPATIENT)
Dept: OPHTHALMOLOGY | Facility: CLINIC | Age: 59
End: 2024-08-20
Payer: MEDICAID

## 2024-08-20 ENCOUNTER — NON-APPOINTMENT (OUTPATIENT)
Age: 59
End: 2024-08-20

## 2024-08-20 PROCEDURE — 92014 COMPRE OPH EXAM EST PT 1/>: CPT

## 2024-08-20 PROCEDURE — 92250 FUNDUS PHOTOGRAPHY W/I&R: CPT

## 2024-10-01 ENCOUNTER — NON-APPOINTMENT (OUTPATIENT)
Age: 59
End: 2024-10-01

## 2024-10-01 ENCOUNTER — APPOINTMENT (OUTPATIENT)
Dept: OPHTHALMOLOGY | Facility: CLINIC | Age: 59
End: 2024-10-01
Payer: MEDICAID

## 2024-10-01 PROCEDURE — 92012 INTRM OPH EXAM EST PATIENT: CPT

## 2024-10-01 PROCEDURE — 92083 EXTENDED VISUAL FIELD XM: CPT

## 2024-10-01 PROCEDURE — 92133 CPTRZD OPH DX IMG PST SGM ON: CPT
